# Patient Record
Sex: FEMALE | Race: WHITE | NOT HISPANIC OR LATINO | Employment: FULL TIME | URBAN - METROPOLITAN AREA
[De-identification: names, ages, dates, MRNs, and addresses within clinical notes are randomized per-mention and may not be internally consistent; named-entity substitution may affect disease eponyms.]

---

## 2024-04-02 NOTE — H&P (VIEW-ONLY)
Chief Complaint:  Patient presents with:  Pain: Lower groin,low back thinks kidney stone       PCP: Justin    SUBJECTIVE:    Adriana Leal is a 43 year old female  that presents to clinic today regarding     RLQ abdominal pain  Started 7 days ago  Feels previous to similar to previous kidney stones   Medications tried ibuprofen, tylneol  Intermittent sensations, no real pattern noted, non radiating.   Denies any diarrhea, vomiting, flank pain, sob, fevers, chills, hematuria, concern for STD, vaginal discharge.    NO Previous urine culture  Denies history of sepsis with kidney stones.  Needed stent x2.   US from 2012 shows 8mm non obstructed right kidney stone but no other imaging in system since then, follows urology with dr. Pettit.    Last Cr wnl Nov 2023  Contacted urology but has not been seen in 6 years in office so they could only get her in on April 11th.   ROS:  See HPI otherwise negative except as noted     HISTORY:  I have reviewed the patients problem list, current medications, allergies, and social history and updated them as needed.      Adriana Leal   reports that she has never smoked. She has never used smokeless tobacco.    OBJECTIVE:    /90   Pulse 117   Temp 37.3 °C (99.1 °F) (Temporal)   Wt 109.1 kg (240 lb 8 oz)   LMP  (LMP Unknown)   SpO2 98%   BMI 39.88 kg/m²     Physical Exam  Constitutional:       General: She is not in acute distress.     Appearance: She is well-developed. She is not diaphoretic.   HENT:      Head: Normocephalic and atraumatic.   Eyes:      Conjunctiva/sclera: Conjunctivae normal.      Pupils: Pupils are equal, round, and reactive to light.   Neck:      Vascular: No JVD.   Cardiovascular:      Rate and Rhythm: Normal rate and regular rhythm.      Heart sounds: Normal heart sounds. No murmur heard.     No friction rub. No gallop.   Pulmonary:      Effort: Pulmonary effort is normal. No respiratory distress.      Breath sounds: Normal breath  sounds. No stridor. No wheezing or rales.   Chest:      Chest wall: No tenderness.   Abdominal:      General: Bowel sounds are normal. There is no distension.      Palpations: Abdomen is soft. There is no mass.      Tenderness: There is no abdominal tenderness. There is no right CVA tenderness, left CVA tenderness, guarding or rebound. Negative signs include psoas sign and obturator sign.   Musculoskeletal:         General: No tenderness or deformity. Normal range of motion.      Cervical back: Normal range of motion and neck supple.      Comments: 2+ peripheral pulses, no delayed capillary refill  <2 seconds, no decreased sensation or decreased range of motion.  Intact plantar reflexes patient able to ambulate.  No skin changes noted no signs of infection.    5/5 strength.    Skin:     General: Skin is warm and dry.      Coloration: Skin is not pale.      Findings: No erythema or rash.   Neurological:      Mental Status: She is alert and oriented to person, place, and time.   Psychiatric:         Behavior: Behavior normal.         Thought Content: Thought content normal.         Judgment: Judgment normal.         MDM: patient aware of risks, competet to make own choices. Declined ER and other care. States its veyr much a kidney stones, wants to treat it like a kidney stone. Went over other abdominal pathologies and risks, patient still declined. Will go to ER if symtpoms persist, aware if urine is positive may need ER anyway, especially if she cannot get a sooner appointment with urology   Relevant lab work, imaging, consults and previous visits reviewed        ASSESSMENT/PLAN:     ASSESSMENT/PLAN:  1. Kidney stone - ICD9: 592.0, ICD10: N20.0 (primary diagnosis)  Toradol, zofran, fluids   Patient is aware she may need stent, I will send her urologist staff message to help expedite care.   -  URINE CULTURE  -  UA DIP B/O   Unremarkable   -  US KIDNEY/BLADDER  -  CBC + DIFF  -  COMP METABOLIC PANEL  -  XR ABDOMEN 2V  ROUTINE SUPINE W UPRIGHT/DECUB/CTL  -  XR ABDOMEN 3V KUB W/OBLIQUES    2. Essential hypertension - ICD9: 401.9, ICD10: I10  -  Controlled  -  Continue current medications  -  Recommend home blood pressure monitoring, to bring results to next visit  -  Encouraged sodium restriction, DASH or Mediterranean diet  -  Recommend regular aerobic exercise      Jannette Carney PA-C        Patient will follow up for this problem as needed. Otherwise will return for annual exam.   Discussed reasons to return or go to ER if symptoms worse or persist.     Medical Decision Making:     Problems:   Moderate:  1+ chronic illnesses with change  Data:   Unique test(s) ordered:  3+  Risk:   Moderate:  Moderate risk from testing/treatment    Medical Decision Making  Level: 4 - Moderate

## 2024-04-08 ENCOUNTER — TELEMEDICINE CLINICAL SUPPORT (OUTPATIENT)
Dept: PREADMISSION TESTING | Facility: HOSPITAL | Age: 44
End: 2024-04-08
Payer: COMMERCIAL

## 2024-04-08 VITALS — BODY MASS INDEX: 38.57 KG/M2 | WEIGHT: 240 LBS | HEIGHT: 66 IN

## 2024-04-08 RX ORDER — KETOROLAC TROMETHAMINE 10 MG/1
10 TABLET, FILM COATED ORAL EVERY 8 HOURS PRN
COMMUNITY
Start: 2024-04-02

## 2024-04-08 RX ORDER — HYDROCHLOROTHIAZIDE 25 MG/1
25 TABLET ORAL DAILY
COMMUNITY
Start: 2018-04-19

## 2024-04-08 RX ORDER — ONDANSETRON 4 MG/1
4 TABLET, ORALLY DISINTEGRATING ORAL EVERY 6 HOURS PRN
COMMUNITY
Start: 2024-04-02 | End: 2024-04-09

## 2024-04-08 RX ORDER — VALACYCLOVIR HYDROCHLORIDE 1 G/1
1000 TABLET, FILM COATED ORAL DAILY PRN
COMMUNITY
Start: 2017-12-24

## 2024-04-08 RX ORDER — AMLODIPINE BESYLATE 5 MG/1
5 TABLET ORAL DAILY
COMMUNITY
Start: 2018-04-19

## 2024-04-08 RX ORDER — FUROSEMIDE 20 MG/1
20 TABLET ORAL DAILY PRN
COMMUNITY
Start: 2018-08-31

## 2024-04-08 RX ORDER — PHENTERMINE HYDROCHLORIDE 37.5 MG/1
37.5 TABLET ORAL
COMMUNITY
Start: 2024-03-29 | End: 2024-06-27

## 2024-04-08 NOTE — PREPROCEDURE INSTRUCTIONS
Current Medications   Medication Instructions    amLODIPine (Norvasc) 5 mg tablet Take morning of surgery with sip of water, no other fluids    furosemide (Lasix) 20 mg tablet Continue until night before surgery    hydroCHLOROthiazide (HYDRODiuril) 25 mg tablet Take morning of surgery with sip of water, no other fluids    ketorolac (Toradol) 10 mg tablet Stop 7 days before surgery    ondansetron ODT (Zofran-ODT) 4 mg disintegrating tablet Take morning of surgery with sip of water, no other fluids    phentermine (Adipex-P) 37.5 mg tablet Pt reports she won't be starting until after surgery    valACYclovir (Valtrex) 1 gram tablet Take morning of surgery with sip of water, no other fluids                       NPO Instructions:    Do not eat any food after midnight the night before your surgery/procedure.    Additional Instructions:     Five Days before Surgery:  Review your medication instructions, stop indicated medications  Three Days before Surgery:  Review your medication instructions, stop indicated medications  The Day before Surgery:  Review your medication instructions, stop indicated medications  You will be contacted regarding the time of your arrival to facility and surgery time  Do not eat any food after Midnight  Day of Surgery:  Review your medication instructions, take indicated medications  If you have diabetes, please check your fasting blood sugar upon awakening.  If fasting blood sugar is <80 mg/dl, drink 100 ml of apple juice, time limit of 2 hours before  Wear  comfortable loose fitting clothing  Do not use moisturizers, creams, lotions or perfume  All jewelry and valuables should be left at home    PAT DISCHARGE INSTRUCTIONS    Please call the Same Day Surgery (SDS) Department of the hospital where your procedure will be performed between 2:00- 3:30 PM the day before your surgery. If you are scheduled on a Monday, or a Tuesday following a Monday holiday, you will need to call on the last business  day prior to your surgery.    [facility]  [address] [place] [address]  [phone]    Please let your surgeon know if:      You develop any open sores, shingles, burning or painful urination as these may increase your risk of an infection.   You no longer wish to have the surgery.   Any other personal circumstances change that may lead to the need to cancel or defer this surgery-such as being sick or getting admitted to any hospital within one week of your planned procedure.    Your contact details change, such as a change of address or phone number.    Starting now:     Please DO NOT drink alcohol or smoke for 24 hours before surgery. It is well known that quitting smoking can make a huge difference to your health and recovery from surgery. The longer you abstain from smoking, the better your chances of a healthy recovery. If you need help with quitting, call 9-800QUIT-NOW to be connected to a trained counselor who will discuss the best methods to help you quit.     Before your surgery:    Please stop all supplements 7 days prior to surgery. Or as directed by your surgeon.   Please stop taking NSAID pain medicine such as Advil and Motrin 7 days before surgery.    If you develop any fever, cough, cold, rashes, cuts, scratches, scrapes, urinary symptoms or infection anywhere on your body (including teeth and gums) prior to surgery, please call your surgeon’s office as soon as possible. This may require treatment to reduce the chance of cancellation on the day of surgery.    The day before your surgery:   DIET- Do not eat any food after MIDNIGHT.   Get a good night’s rest.  Use the special soap for bathing if you have been instructed to use one.    Scheduled surgery times may change and you will be notified if this occurs - please check your personal voicemail for any updates.     On the morning of surgery:   Wear comfortable, loose fitting clothes which open in the  front. Please do not wear moisturizers, creams, lotions, makeup or perfume.    Please bring with you to surgery:   Photo ID and insurance card   Current list of medicines and allergies   Pacemaker/ Defibrillator/Heart stent cards   CPAP machine and mask    Slings/ splints/ crutches   A copy of your complete advanced directive/DHPOA.    Please do NOT bring with you to surgery:   All jewelry and valuables should be left at home.   Prosthetic devices such as contact lenses, hearing aids, dentures, eyelash extensions, hairpins and body piercings must be removed prior to going in to the surgical suite.    After outpatient surgery:   A responsible adult MUST accompany you at the time of discharge and stay with you for 24 hours after your surgery. You may NOT drive yourself home after surgery.    Do not drive, operate machinery, make critical decisions or do activities that require co-ordination or balance until after a night’s sleep.   Do not drink alcoholic beverages for 24 hours.   Instructions for resuming your medications will be provided by your surgeon.    CALL YOUR DOCTOR AFTER SURGERY IF YOU HAVE:     Chills and/or a fever of 101° F or higher.    Redness, swelling, pus or drainage from your surgical wound or a bad smell from the wound.    Lightheadedness, fainting or confusion.    Persistent vomiting (throwing up) and are not able to eat or drink for 12 hours.    Three or more loose, watery bowel movements in 24 hours (diarrhea).   Difficulty or pain while urinating( after non-urological surgery)    Pain and swelling in your legs, especially if it is only on one side.    Difficulty breathing or are breathing faster than normal.    Any new concerning symptoms.      Reviewed pre-op instructions with patient, states understanding and denies further questions at this time.    If you have not received a call regarding your arrival time for surgery by 2pm on the day before surgery, you can call [phone].    Take  Care!

## 2024-04-11 ENCOUNTER — ANESTHESIA EVENT (OUTPATIENT)
Dept: OPERATING ROOM | Facility: HOSPITAL | Age: 44
End: 2024-04-11
Payer: COMMERCIAL

## 2024-04-12 ENCOUNTER — HOSPITAL ENCOUNTER (OUTPATIENT)
Facility: HOSPITAL | Age: 44
Setting detail: OUTPATIENT SURGERY
Discharge: HOME | End: 2024-04-12
Attending: UROLOGY | Admitting: UROLOGY
Payer: COMMERCIAL

## 2024-04-12 ENCOUNTER — ANESTHESIA (OUTPATIENT)
Dept: OPERATING ROOM | Facility: HOSPITAL | Age: 44
End: 2024-04-12
Payer: COMMERCIAL

## 2024-04-12 VITALS
WEIGHT: 243.17 LBS | HEIGHT: 66 IN | BODY MASS INDEX: 39.08 KG/M2 | HEART RATE: 97 BPM | SYSTOLIC BLOOD PRESSURE: 146 MMHG | RESPIRATION RATE: 16 BRPM | OXYGEN SATURATION: 97 % | DIASTOLIC BLOOD PRESSURE: 105 MMHG | TEMPERATURE: 97.3 F

## 2024-04-12 PROCEDURE — 3600000008 HC OR TIME - EACH INCREMENTAL 1 MINUTE - PROCEDURE LEVEL THREE: Performed by: UROLOGY

## 2024-04-12 PROCEDURE — 7100000009 HC PHASE TWO TIME - INITIAL BASE CHARGE: Performed by: UROLOGY

## 2024-04-12 PROCEDURE — A50590 PR FRAGMENT KIDNEY STONE/ ESWL: Performed by: ANESTHESIOLOGY

## 2024-04-12 PROCEDURE — A50590 PR FRAGMENT KIDNEY STONE/ ESWL: Performed by: ANESTHESIOLOGIST ASSISTANT

## 2024-04-12 PROCEDURE — 7100000002 HC RECOVERY ROOM TIME - EACH INCREMENTAL 1 MINUTE: Performed by: UROLOGY

## 2024-04-12 PROCEDURE — 3700000002 HC GENERAL ANESTHESIA TIME - EACH INCREMENTAL 1 MINUTE: Performed by: UROLOGY

## 2024-04-12 PROCEDURE — 2780000003 HC OR 278 NO HCPCS: Performed by: UROLOGY

## 2024-04-12 PROCEDURE — 2550000001 HC RX 255 CONTRASTS: Performed by: UROLOGY

## 2024-04-12 PROCEDURE — 7100000010 HC PHASE TWO TIME - EACH INCREMENTAL 1 MINUTE: Performed by: UROLOGY

## 2024-04-12 PROCEDURE — 3700000001 HC GENERAL ANESTHESIA TIME - INITIAL BASE CHARGE: Performed by: UROLOGY

## 2024-04-12 PROCEDURE — 2500000004 HC RX 250 GENERAL PHARMACY W/ HCPCS (ALT 636 FOR OP/ED): Mod: JZ | Performed by: UROLOGY

## 2024-04-12 PROCEDURE — C2617 STENT, NON-COR, TEM W/O DEL: HCPCS | Performed by: UROLOGY

## 2024-04-12 PROCEDURE — 2500000004 HC RX 250 GENERAL PHARMACY W/ HCPCS (ALT 636 FOR OP/ED): Performed by: ANESTHESIOLOGIST ASSISTANT

## 2024-04-12 PROCEDURE — C1769 GUIDE WIRE: HCPCS | Performed by: UROLOGY

## 2024-04-12 PROCEDURE — 7100000001 HC RECOVERY ROOM TIME - INITIAL BASE CHARGE: Performed by: UROLOGY

## 2024-04-12 PROCEDURE — 3600000003 HC OR TIME - INITIAL BASE CHARGE - PROCEDURE LEVEL THREE: Performed by: UROLOGY

## 2024-04-12 PROCEDURE — 2720000007 HC OR 272 NO HCPCS: Performed by: UROLOGY

## 2024-04-12 DEVICE — INLAY OPTIMA URETERAL STENT W/O GUIDEWIRE
Type: IMPLANTABLE DEVICE | Site: URETER | Status: FUNCTIONAL
Brand: BARD® INLAY OPTIMA® URETERAL STENT

## 2024-04-12 RX ORDER — SODIUM CHLORIDE, SODIUM LACTATE, POTASSIUM CHLORIDE, CALCIUM CHLORIDE 600; 310; 30; 20 MG/100ML; MG/100ML; MG/100ML; MG/100ML
INJECTION, SOLUTION INTRAVENOUS CONTINUOUS PRN
Status: DISCONTINUED | OUTPATIENT
Start: 2024-04-12 | End: 2024-04-12

## 2024-04-12 RX ORDER — FENTANYL CITRATE 50 UG/ML
INJECTION, SOLUTION INTRAMUSCULAR; INTRAVENOUS AS NEEDED
Status: DISCONTINUED | OUTPATIENT
Start: 2024-04-12 | End: 2024-04-12

## 2024-04-12 RX ORDER — ACETAMINOPHEN 325 MG/1
650 TABLET ORAL EVERY 4 HOURS PRN
Status: DISCONTINUED | OUTPATIENT
Start: 2024-04-12 | End: 2024-04-12 | Stop reason: HOSPADM

## 2024-04-12 RX ORDER — PROPOFOL 10 MG/ML
INJECTION, EMULSION INTRAVENOUS AS NEEDED
Status: DISCONTINUED | OUTPATIENT
Start: 2024-04-12 | End: 2024-04-12

## 2024-04-12 RX ORDER — SODIUM CHLORIDE, SODIUM LACTATE, POTASSIUM CHLORIDE, CALCIUM CHLORIDE 600; 310; 30; 20 MG/100ML; MG/100ML; MG/100ML; MG/100ML
100 INJECTION, SOLUTION INTRAVENOUS CONTINUOUS
Status: DISCONTINUED | OUTPATIENT
Start: 2024-04-12 | End: 2024-04-12 | Stop reason: HOSPADM

## 2024-04-12 RX ORDER — ONDANSETRON HYDROCHLORIDE 2 MG/ML
4 INJECTION, SOLUTION INTRAVENOUS ONCE AS NEEDED
Status: DISCONTINUED | OUTPATIENT
Start: 2024-04-12 | End: 2024-04-12 | Stop reason: HOSPADM

## 2024-04-12 RX ORDER — METOCLOPRAMIDE HYDROCHLORIDE 5 MG/ML
10 INJECTION INTRAMUSCULAR; INTRAVENOUS ONCE AS NEEDED
Status: DISCONTINUED | OUTPATIENT
Start: 2024-04-12 | End: 2024-04-12 | Stop reason: HOSPADM

## 2024-04-12 RX ORDER — CEFAZOLIN SODIUM 2 G/100ML
2 INJECTION, SOLUTION INTRAVENOUS ONCE
Status: COMPLETED | OUTPATIENT
Start: 2024-04-12 | End: 2024-04-12

## 2024-04-12 RX ORDER — ONDANSETRON HYDROCHLORIDE 2 MG/ML
INJECTION, SOLUTION INTRAVENOUS AS NEEDED
Status: DISCONTINUED | OUTPATIENT
Start: 2024-04-12 | End: 2024-04-12

## 2024-04-12 RX ORDER — MIDAZOLAM HYDROCHLORIDE 1 MG/ML
INJECTION, SOLUTION INTRAMUSCULAR; INTRAVENOUS AS NEEDED
Status: DISCONTINUED | OUTPATIENT
Start: 2024-04-12 | End: 2024-04-12

## 2024-04-12 RX ORDER — LIDOCAINE HYDROCHLORIDE 10 MG/ML
0.1 INJECTION INFILTRATION; PERINEURAL ONCE
Status: DISCONTINUED | OUTPATIENT
Start: 2024-04-12 | End: 2024-04-12 | Stop reason: HOSPADM

## 2024-04-12 RX ORDER — KETOROLAC TROMETHAMINE 30 MG/ML
INJECTION, SOLUTION INTRAMUSCULAR; INTRAVENOUS AS NEEDED
Status: DISCONTINUED | OUTPATIENT
Start: 2024-04-12 | End: 2024-04-12

## 2024-04-12 RX ADMIN — FENTANYL CITRATE 25 MCG: 50 INJECTION INTRAMUSCULAR; INTRAVENOUS at 08:21

## 2024-04-12 RX ADMIN — MIDAZOLAM 2 MG: 1 INJECTION INTRAMUSCULAR; INTRAVENOUS at 08:06

## 2024-04-12 RX ADMIN — FENTANYL CITRATE 50 MCG: 50 INJECTION INTRAMUSCULAR; INTRAVENOUS at 08:06

## 2024-04-12 RX ADMIN — CEFAZOLIN SODIUM 2 G: 2 INJECTION, SOLUTION INTRAVENOUS at 08:11

## 2024-04-12 RX ADMIN — ONDANSETRON 4 MG: 2 INJECTION INTRAMUSCULAR; INTRAVENOUS at 08:31

## 2024-04-12 RX ADMIN — SODIUM CHLORIDE, POTASSIUM CHLORIDE, SODIUM LACTATE AND CALCIUM CHLORIDE: 600; 310; 30; 20 INJECTION, SOLUTION INTRAVENOUS at 07:32

## 2024-04-12 RX ADMIN — PROPOFOL 200 MG: 10 INJECTION, EMULSION INTRAVENOUS at 08:06

## 2024-04-12 RX ADMIN — SODIUM CHLORIDE, POTASSIUM CHLORIDE, SODIUM LACTATE AND CALCIUM CHLORIDE: 600; 310; 30; 20 INJECTION, SOLUTION INTRAVENOUS at 08:11

## 2024-04-12 RX ADMIN — FENTANYL CITRATE 25 MCG: 50 INJECTION INTRAMUSCULAR; INTRAVENOUS at 08:39

## 2024-04-12 RX ADMIN — KETOROLAC TROMETHAMINE 30 MG: 30 INJECTION, SOLUTION INTRAMUSCULAR; INTRAVENOUS at 08:43

## 2024-04-12 SDOH — HEALTH STABILITY: MENTAL HEALTH: CURRENT SMOKER: 0

## 2024-04-12 ASSESSMENT — PAIN - FUNCTIONAL ASSESSMENT
PAIN_FUNCTIONAL_ASSESSMENT: 0-10

## 2024-04-12 ASSESSMENT — COLUMBIA-SUICIDE SEVERITY RATING SCALE - C-SSRS
6. HAVE YOU EVER DONE ANYTHING, STARTED TO DO ANYTHING, OR PREPARED TO DO ANYTHING TO END YOUR LIFE?: NO
1. IN THE PAST MONTH, HAVE YOU WISHED YOU WERE DEAD OR WISHED YOU COULD GO TO SLEEP AND NOT WAKE UP?: NO
2. HAVE YOU ACTUALLY HAD ANY THOUGHTS OF KILLING YOURSELF?: NO

## 2024-04-12 ASSESSMENT — PAIN SCALES - GENERAL
PAINLEVEL_OUTOF10: 0 - NO PAIN
PAIN_LEVEL: 2
PAINLEVEL_OUTOF10: 0 - NO PAIN

## 2024-04-12 NOTE — ANESTHESIA PREPROCEDURE EVALUATION
Patient: Adriana Leal    Procedure Information       Date/Time: 04/12/24 0745    Procedures:       Lithotripsy Extracorporeal Shock Wave (Right)      Cystoscopy with Insertion Stent Ureter ( REP WILY MENA [phone] ) (Right)    Location: [facility] / [facility]    Surgeons: Irvin Jay MD            Relevant Problems   No relevant active problems       Clinical information reviewed:   Tobacco  Allergies  Meds   Med Hx  Surg Hx  OB Status  Fam Hx  Soc   Hx        NPO Detail:  NPO/Void Status  Date of Last Liquid: 04/11/24  Time of Last Liquid: 2130  Date of Last Solid: 04/11/24  Time of Last Solid: 1800  Time of Last Void: 0623         Physical Exam    Airway  Mallampati: III  TM distance: >3 FB  Neck ROM: full     Cardiovascular - normal exam     Dental    Pulmonary - normal exam     Abdominal            Anesthesia Plan    History of general anesthesia?: yes  History of complications of general anesthesia?: no    ASA 2     MAC     The patient is not a current smoker.    intravenous induction   Anesthetic plan and risks discussed with patient.  Use of blood products discussed with patient who.    Plan discussed with CAA.

## 2024-04-12 NOTE — INTERVAL H&P NOTE
H&P reviewed. The patient was examined and there are no changes to the H&P.  Cystoscopy, right retrograde pyelogram, stent, ESWL today.

## 2024-04-12 NOTE — OP NOTE
Lithotripsy Extracorporeal Shock Wave (R), Cystoscopy with Insertion Stent Ureter ( REP WILY MENA [phone] ) (R) Operative Note     Date: 2024  OR Location: [facility]    Name: Adriana Leal : 1980, Age: 43 y.o., MRN: [id number], Sex: female    Diagnosis  Pre-op Diagnosis     * Calculus of kidney [N20.0] Post-op Diagnosis     * Calculus of kidney [N20.0]     Procedures  Lithotripsy Extracorporeal Shock Wave  04605 - [place] LITHOTRIPSY XTRCORP SHOCK WAVE    Cystoscopy with Insertion Stent Ureter ( REP WILY MENA [phone] )  25076 - [place] CYSTO W/INSERT URETERAL STENT      Surgeons      * Irvin Jay - Primary    Resident/Fellow/Other Assistant:  Surgeons and Role:  * No surgeons found with a matching role *    Procedure Summary  Anesthesia: General  ASA: II  Anesthesia Staff: Anesthesiologist: Richelle Peoples DO  C-AA: CLINT Lundberg  Estimated Blood Loss: 0 mL  Intra-op Medications:   Administrations occurring from 0745 to 0845 on 24:   Medication Name Total Dose   iohexol (OMNIPaque) 240 mg iodine/mL solution 5 mL   ceFAZolin in dextrose (iso-os) (Ancef) IVPB 2 g 2 g              Anesthesia Record               Intraprocedure I/O Totals          Intake    LR infusion 600.00 mL    Total Intake 600 mL          Specimen: No specimens collected     Staff:   Circulator: Reina Salcedo RN  Scrub Person: Merari Downs           Drains and/or Catheters: 6 Spanish by 26 cm double-J ureteral stent    Findings: Stone fragmented    Indications: Adriana Leal is an 43 y.o. female who is having surgery for RT KIDNEY STONE N20.0.  She has a large stone near the UPJ and a smaller 1 cm stone nearby in the right kidney.  Options were reviewed she is scheduled for right-sided ESWL, and stent placement.  Risks including infection, bleeding, injury to kidney, obstruction for fragments, and stent discomfort were removed reviewed.  She understands she may require a follow-up procedure.  Written  consent was obtained.    Procedure Details: Patient was taken to the operating room and given general anesthesia.  She was sterilely prepped and draped in the modified lithotomy position.  A cystoscope was advanced through the urethra and into the bladder.  No bladder tumors or stones were identified.  A open-ended ureteral catheter was inserted into the right ureteral orifice.  Retrograde pyelograms performed to delineate anatomy.  The stones were visible near the renal pelvis.  A 6 Polish by 26 cm double-J ureteral stent was advanced over a flexible wire and into the kidney.  Correct positioning was confirmed with fluoroscopy and visualization of the distal end within the bladder.  ESWL was performed.  Total 3000 shocks were delivered there appeared to be good fragmentation of the large stone.  The smaller stone may have been included in the lithotripsy and was not clearly visible at the completion.  The patient tolerated the procedure was transferred to the recovery room in stable condition.  She will follow-up in the office with a KUB.    Complications:  None; patient tolerated the procedure well.          Irvin Jay  Phone Number: [phone]

## 2024-04-12 NOTE — ANESTHESIA POSTPROCEDURE EVALUATION
Patient: Adriana Leal    Procedure Summary       Date: 04/12/24 Room / Location: [facility]    Anesthesia Start: 0801 Anesthesia Stop: 0900    Procedures:       Lithotripsy Extracorporeal Shock Wave (Right)      Cystoscopy with Insertion Stent Ureter ( REP WILY MENA [phone] ) (Right) Diagnosis:       Calculus of kidney      (RT KIDNEY STONE N20.0)    Surgeons: Irvin Jay MD Responsible Provider: Richelle Peoples DO    Anesthesia Type: MAC ASA Status: 2            Anesthesia Type: MAC    Vitals Value Taken Time   /93 04/12/24 0900   Temp 36 04/12/24 0900   Pulse 100 04/12/24 0900   Resp 18 04/12/24 0900   SpO2 98 04/12/24 0900       Anesthesia Post Evaluation    Patient location during evaluation: PACU  Patient participation: complete - patient participated  Level of consciousness: awake and alert  Pain score: 2  Pain management: adequate  Airway patency: patent  Cardiovascular status: acceptable  Respiratory status: acceptable  Hydration status: acceptable  Postoperative Nausea and Vomiting: none        No notable events documented.

## 2024-04-12 NOTE — ANESTHESIA PROCEDURE NOTES
Airway  Date/Time: 4/12/2024 8:09 AM  Urgency: elective    Airway not difficult    Staffing  Performed: CLINT   Authorized by: Richelle Peoples DO    Performed by: CLINT Lundberg  Patient location during procedure: OR    Indications and Patient Condition  Indications for airway management: anesthesia  Spontaneous Ventilation: absent  Preoxygenated: yes  Mask difficulty assessment: 1 - vent by mask    Final Airway Details  Final airway type: supraglottic airway      Successful airway: Size 4     Number of attempts at approach: 1

## 2024-04-15 ASSESSMENT — PAIN SCALES - GENERAL: PAINLEVEL_OUTOF10: 6

## 2024-04-29 ENCOUNTER — TELEPHONE (OUTPATIENT)
Dept: PREADMISSION TESTING | Facility: HOSPITAL | Age: 44
End: 2024-04-29

## 2024-04-29 ENCOUNTER — ANESTHESIA EVENT (OUTPATIENT)
Dept: OPERATING ROOM | Facility: HOSPITAL | Age: 44
End: 2024-04-29
Payer: COMMERCIAL

## 2024-04-30 RX ORDER — SODIUM CHLORIDE, SODIUM LACTATE, POTASSIUM CHLORIDE, CALCIUM CHLORIDE 600; 310; 30; 20 MG/100ML; MG/100ML; MG/100ML; MG/100ML
100 INJECTION, SOLUTION INTRAVENOUS CONTINUOUS
Status: CANCELLED | OUTPATIENT
Start: 2024-04-30

## 2024-04-30 RX ORDER — ONDANSETRON HYDROCHLORIDE 2 MG/ML
4 INJECTION, SOLUTION INTRAVENOUS ONCE AS NEEDED
Status: CANCELLED | OUTPATIENT
Start: 2024-04-30

## 2024-04-30 RX ORDER — DROPERIDOL 2.5 MG/ML
0.62 INJECTION, SOLUTION INTRAMUSCULAR; INTRAVENOUS ONCE AS NEEDED
Status: CANCELLED | OUTPATIENT
Start: 2024-04-30

## 2024-04-30 RX ORDER — OXYCODONE HYDROCHLORIDE 5 MG/1
5 TABLET ORAL EVERY 4 HOURS PRN
Status: CANCELLED | OUTPATIENT
Start: 2024-04-30

## 2024-05-01 ENCOUNTER — APPOINTMENT (OUTPATIENT)
Dept: RADIOLOGY | Facility: HOSPITAL | Age: 44
End: 2024-05-01
Payer: COMMERCIAL

## 2024-05-01 ENCOUNTER — HOSPITAL ENCOUNTER (OUTPATIENT)
Facility: HOSPITAL | Age: 44
Setting detail: OUTPATIENT SURGERY
Discharge: HOME | End: 2024-05-01
Attending: UROLOGY | Admitting: UROLOGY
Payer: COMMERCIAL

## 2024-05-01 ENCOUNTER — ANESTHESIA (OUTPATIENT)
Dept: OPERATING ROOM | Facility: HOSPITAL | Age: 44
End: 2024-05-01
Payer: COMMERCIAL

## 2024-05-01 VITALS
SYSTOLIC BLOOD PRESSURE: 184 MMHG | HEART RATE: 95 BPM | WEIGHT: 240.52 LBS | TEMPERATURE: 97.7 F | RESPIRATION RATE: 16 BRPM | HEIGHT: 68 IN | BODY MASS INDEX: 36.45 KG/M2 | OXYGEN SATURATION: 99 % | DIASTOLIC BLOOD PRESSURE: 103 MMHG

## 2024-05-01 DIAGNOSIS — N20.0 KIDNEY STONE: Primary | ICD-10-CM

## 2024-05-01 PROBLEM — I10 HTN (HYPERTENSION): Status: ACTIVE | Noted: 2024-05-01

## 2024-05-01 PROCEDURE — 2500000005 HC RX 250 GENERAL PHARMACY W/O HCPCS: Performed by: NURSE ANESTHETIST, CERTIFIED REGISTERED

## 2024-05-01 PROCEDURE — 82365 CALCULUS SPECTROSCOPY: CPT | Performed by: UROLOGY

## 2024-05-01 PROCEDURE — 7100000009 HC PHASE TWO TIME - INITIAL BASE CHARGE: Performed by: UROLOGY

## 2024-05-01 PROCEDURE — 87086 URINE CULTURE/COLONY COUNT: CPT | Mod: GEALAB | Performed by: UROLOGY

## 2024-05-01 PROCEDURE — C1769 GUIDE WIRE: HCPCS | Performed by: UROLOGY

## 2024-05-01 PROCEDURE — 3700000002 HC GENERAL ANESTHESIA TIME - EACH INCREMENTAL 1 MINUTE: Performed by: UROLOGY

## 2024-05-01 PROCEDURE — A52310 PR CYSTOSCOPY,REMV CALCULUS,SIMPLE: Performed by: NURSE ANESTHETIST, CERTIFIED REGISTERED

## 2024-05-01 PROCEDURE — 3600000008 HC OR TIME - EACH INCREMENTAL 1 MINUTE - PROCEDURE LEVEL THREE: Performed by: UROLOGY

## 2024-05-01 PROCEDURE — A52310 PR CYSTOSCOPY,REMV CALCULUS,SIMPLE: Performed by: ANESTHESIOLOGY

## 2024-05-01 PROCEDURE — 7100000001 HC RECOVERY ROOM TIME - INITIAL BASE CHARGE: Performed by: UROLOGY

## 2024-05-01 PROCEDURE — 3600000003 HC OR TIME - INITIAL BASE CHARGE - PROCEDURE LEVEL THREE: Performed by: UROLOGY

## 2024-05-01 PROCEDURE — 3700000001 HC GENERAL ANESTHESIA TIME - INITIAL BASE CHARGE: Performed by: UROLOGY

## 2024-05-01 PROCEDURE — 7100000002 HC RECOVERY ROOM TIME - EACH INCREMENTAL 1 MINUTE: Performed by: UROLOGY

## 2024-05-01 PROCEDURE — 7100000010 HC PHASE TWO TIME - EACH INCREMENTAL 1 MINUTE: Performed by: UROLOGY

## 2024-05-01 PROCEDURE — 76000 FLUOROSCOPY <1 HR PHYS/QHP: CPT | Mod: 59

## 2024-05-01 PROCEDURE — 2500000004 HC RX 250 GENERAL PHARMACY W/ HCPCS (ALT 636 FOR OP/ED): Performed by: ANESTHESIOLOGY

## 2024-05-01 PROCEDURE — 2720000007 HC OR 272 NO HCPCS: Performed by: UROLOGY

## 2024-05-01 PROCEDURE — 2500000004 HC RX 250 GENERAL PHARMACY W/ HCPCS (ALT 636 FOR OP/ED): Performed by: NURSE ANESTHETIST, CERTIFIED REGISTERED

## 2024-05-01 RX ORDER — CEFAZOLIN SODIUM 2 G/100ML
2 INJECTION, SOLUTION INTRAVENOUS ONCE
Status: DISCONTINUED | OUTPATIENT
Start: 2024-05-01 | End: 2024-05-01 | Stop reason: HOSPADM

## 2024-05-01 RX ORDER — SODIUM CHLORIDE, SODIUM LACTATE, POTASSIUM CHLORIDE, CALCIUM CHLORIDE 600; 310; 30; 20 MG/100ML; MG/100ML; MG/100ML; MG/100ML
100 INJECTION, SOLUTION INTRAVENOUS CONTINUOUS
Status: DISCONTINUED | OUTPATIENT
Start: 2024-05-01 | End: 2024-05-01 | Stop reason: HOSPADM

## 2024-05-01 RX ORDER — CEFAZOLIN 1 G/1
INJECTION, POWDER, FOR SOLUTION INTRAVENOUS AS NEEDED
Status: DISCONTINUED | OUTPATIENT
Start: 2024-05-01 | End: 2024-05-01

## 2024-05-01 RX ORDER — FENTANYL CITRATE 50 UG/ML
INJECTION, SOLUTION INTRAMUSCULAR; INTRAVENOUS AS NEEDED
Status: DISCONTINUED | OUTPATIENT
Start: 2024-05-01 | End: 2024-05-01

## 2024-05-01 RX ORDER — KETOROLAC TROMETHAMINE 30 MG/ML
INJECTION, SOLUTION INTRAMUSCULAR; INTRAVENOUS AS NEEDED
Status: DISCONTINUED | OUTPATIENT
Start: 2024-05-01 | End: 2024-05-01

## 2024-05-01 RX ORDER — LIDOCAINE HYDROCHLORIDE 20 MG/ML
INJECTION, SOLUTION INFILTRATION; PERINEURAL AS NEEDED
Status: DISCONTINUED | OUTPATIENT
Start: 2024-05-01 | End: 2024-05-01

## 2024-05-01 RX ORDER — KETOROLAC TROMETHAMINE 10 MG/1
10 TABLET, FILM COATED ORAL EVERY 6 HOURS PRN
Qty: 20 TABLET | Refills: 0 | Status: SHIPPED | OUTPATIENT
Start: 2024-05-01

## 2024-05-01 RX ORDER — ONDANSETRON HYDROCHLORIDE 2 MG/ML
INJECTION, SOLUTION INTRAVENOUS AS NEEDED
Status: DISCONTINUED | OUTPATIENT
Start: 2024-05-01 | End: 2024-05-01

## 2024-05-01 RX ORDER — MIDAZOLAM HYDROCHLORIDE 1 MG/ML
INJECTION INTRAMUSCULAR; INTRAVENOUS AS NEEDED
Status: DISCONTINUED | OUTPATIENT
Start: 2024-05-01 | End: 2024-05-01

## 2024-05-01 RX ORDER — PROPOFOL 10 MG/ML
INJECTION, EMULSION INTRAVENOUS AS NEEDED
Status: DISCONTINUED | OUTPATIENT
Start: 2024-05-01 | End: 2024-05-01

## 2024-05-01 RX ADMIN — MIDAZOLAM HYDROCHLORIDE 2 MG: 1 INJECTION, SOLUTION INTRAMUSCULAR; INTRAVENOUS at 12:40

## 2024-05-01 RX ADMIN — DEXAMETHASONE SODIUM PHOSPHATE 4 MG: 4 INJECTION INTRA-ARTICULAR; INTRALESIONAL; INTRAMUSCULAR; INTRAVENOUS; SOFT TISSUE at 12:59

## 2024-05-01 RX ADMIN — KETOROLAC TROMETHAMINE 30 MG: 30 INJECTION, SOLUTION INTRAMUSCULAR; INTRAVENOUS at 12:59

## 2024-05-01 RX ADMIN — PROPOFOL 200 MG: 10 INJECTION, EMULSION INTRAVENOUS at 12:44

## 2024-05-01 RX ADMIN — CEFAZOLIN 2 G: 330 INJECTION, POWDER, FOR SOLUTION INTRAMUSCULAR; INTRAVENOUS at 12:52

## 2024-05-01 RX ADMIN — ONDANSETRON 4 MG: 2 INJECTION, SOLUTION INTRAMUSCULAR; INTRAVENOUS at 12:59

## 2024-05-01 RX ADMIN — SODIUM CHLORIDE, POTASSIUM CHLORIDE, SODIUM LACTATE AND CALCIUM CHLORIDE 100 ML/HR: 600; 310; 30; 20 INJECTION, SOLUTION INTRAVENOUS at 11:25

## 2024-05-01 RX ADMIN — LIDOCAINE HYDROCHLORIDE 100 MG: 20 INJECTION, SOLUTION INFILTRATION; PERINEURAL at 12:44

## 2024-05-01 RX ADMIN — FENTANYL CITRATE 100 MCG: 50 INJECTION, SOLUTION INTRAMUSCULAR; INTRAVENOUS at 12:44

## 2024-05-01 SDOH — HEALTH STABILITY: MENTAL HEALTH: CURRENT SMOKER: 0

## 2024-05-01 ASSESSMENT — COLUMBIA-SUICIDE SEVERITY RATING SCALE - C-SSRS
1. IN THE PAST MONTH, HAVE YOU WISHED YOU WERE DEAD OR WISHED YOU COULD GO TO SLEEP AND NOT WAKE UP?: NO
6. HAVE YOU EVER DONE ANYTHING, STARTED TO DO ANYTHING, OR PREPARED TO DO ANYTHING TO END YOUR LIFE?: NO
2. HAVE YOU ACTUALLY HAD ANY THOUGHTS OF KILLING YOURSELF?: NO

## 2024-05-01 NOTE — ANESTHESIA PROCEDURE NOTES
Airway  Date/Time: 5/1/2024 12:44 PM  Urgency: elective    Airway not difficult    Staffing  Performed: CRNA   Authorized by: Igor Jack MD    Performed by: BETSY Gary-MANNY  Patient location during procedure: OR    Indications and Patient Condition  Indications for airway management: anesthesia  Spontaneous ventilation: present  Sedation level: deep  Preoxygenated: yes  Patient position: sniffing  MILS maintained throughout  Mask difficulty assessment: 0 - not attempted    Final Airway Details  Final airway type: supraglottic airway      Successful airway: Supraglottic airway: Igel.  Size 4     Number of attempts at approach: 1  Ventilation between attempts: BVM  Number of other approaches attempted: 0

## 2024-05-01 NOTE — ANESTHESIA PREPROCEDURE EVALUATION
Patient: Adriana Leal    Procedure Information       Date/Time: 24 1200    Procedures:       CYSTOSCOPY WITH LITHOTRIPSY LASER (Right)      CYSTOSCOPY WITH REMOVAL STENT URETER (Right)    [facility]    Surgeons: Irvin Jay MD          There were no vitals filed for this visit.    Past Surgical History:   Procedure Laterality Date   • CARPAL TUNNEL RELEASE Bilateral    •  SECTION, CLASSIC  2014     Section   • COSMETIC SURGERY      breast reduction   • EXTRACORPOREAL SHOCK WAVE LITHOTRIPSY Right 2024   • KIDNEY STONE SURGERY       Past Medical History:   Diagnosis Date   • Class 2 obesity with body mass index (BMI) of 39.0 to 39.9 in adult    • Hypertension    • Nephrolithiasis     Right   • Personal history of diseases of the skin and subcutaneous tissue 2014    History of impetigo   • Personal history of other infectious and parasitic diseases 2014    History of herpes simplex infection   • Urinary tract infection        Current Facility-Administered Medications:   •  lactated Ringer's infusion, 100 mL/hr, intravenous, Continuous, Igor Jack MD  Prior to Admission medications    Medication Sig Start Date End Date Taking? Authorizing Provider   amLODIPine (Norvasc) 5 mg tablet Take 1 tablet (5 mg) by mouth once daily. 18   Historical Provider, MD   furosemide (Lasix) 20 mg tablet Take 1 tablet (20 mg) by mouth once daily as needed. 18   Historical Provider, MD   hydroCHLOROthiazide (HYDRODiuril) 25 mg tablet Take 1 tablet (25 mg) by mouth once daily. 18   Historical Provider, MD   ketorolac (Toradol) 10 mg tablet Take 1 tablet (10 mg) by mouth every 8 hours if needed. 24   Historical Provider, MD   phentermine (Adipex-P) 37.5 mg tablet Take 1 tablet (37.5 mg) by mouth once daily. Not yet started 3/29/24 6/27/24  Historical Provider, MD   valACYclovir (Valtrex) 1 gram tablet Take 1 tablet (1,000 mg) by mouth  "once daily as needed. 12/24/17   Historical Provider, MD     Allergies   Allergen Reactions   • Clindamycin Other     Elevated temperature, flushing of face   • Sulfa (Sulfonamide Antibiotics) Swelling   • Metformin Cough   • Penicillins Rash     Social History     Tobacco Use   • Smoking status: Never   • Smokeless tobacco: Never   Substance Use Topics   • Alcohol use: Yes     Comment: occasionally/ rare         Chemistry    Lab Results   Component Value Date/Time     05/24/2018 0929    K 4.0 05/24/2018 0929     05/24/2018 0929    CO2 27 05/24/2018 0929    BUN 13 05/24/2018 0929    CREATININE 0.73 05/24/2018 0929    Lab Results   Component Value Date/Time    CALCIUM 9.8 05/24/2018 0929    ALKPHOS 71 02/07/2018 1117    AST 15 05/24/2018 0929    ALT 21 02/07/2018 1117    BILITOT 1.0 02/07/2018 1117          Lab Results   Component Value Date/Time    WBC 11.1 02/07/2018 1117    HGB 13.1 02/07/2018 1117    HCT 39.8 02/07/2018 1117     02/07/2018 1117     No results found for: \"PROTIME\", \"PTT\", \"INR\"  No results found for this or any previous visit (from the past 4464 hour(s)).  No results found for this or any previous visit from the past 1095 days.    Relevant Problems   Cardiac   (+) HTN (hypertension)      Pulmonary (within normal limits)      Neuro (within normal limits)      GI (within normal limits)      /Renal   (+) Nephrolithiasis      Liver (within normal limits)      Endocrine (within normal limits)      Hematology (within normal limits)      Musculoskeletal (within normal limits)      ID (within normal limits)      Skin (within normal limits)      GYN (within normal limits)       Clinical information reviewed:       Med Hx  Surg Hx            NPO Detail:  No data recorded     Physical Exam    Airway  Mallampati: II  TM distance: >3 FB  Neck ROM: full     Cardiovascular - normal exam     Dental    Pulmonary    Abdominal          Anesthesia Plan    History of general anesthesia?: " yes  History of complications of general anesthesia?: no    ASA 3     general     The patient is not a current smoker.  Patient was not previously instructed to abstain from smoking on day of procedure.  Patient did not smoke on day of procedure.  Education provided regarding risk of obstructive sleep apnea.  intravenous induction   Anesthetic plan and risks discussed with patient.    Plan discussed with CRNA.

## 2024-05-01 NOTE — INTERVAL H&P NOTE
H&P reviewed. The patient was examined and there are no changes to the H&P.  Right ureteroscopy, laser lithotripsy, stone basketing, stent placement today

## 2024-05-01 NOTE — OP NOTE
CYSTOSCOPY WITH LITHOTRIPSY LASER (R), CYSTOSCOPY WITH REMOVAL STENT URETER (R) Operative Note     Date: 2024  OR Location: [facility]    Name: Adriana Leal : 1980, Age: 43 y.o., MRN: [id number], Sex: female    Diagnosis  Pre-op Diagnosis     * Kidney stone [N20.0] Post-op Diagnosis     * Kidney stone [N20.0]     Procedures  CYSTOSCOPY WITH LITHOTRIPSY LASER  [address]11 - [place] CYSTO W/URETEROSCOPY W/LITHOTRIPSY    CYSTOSCOPY WITH REMOVAL STENT URETER  [address]87 - [place] CYSTO W/SIMPLE REMOVAL STONE & STENT      Surgeons      * Irvin Jay - Primary    Resident/Fellow/Other Assistant:  Surgeons and Role:  * No surgeons found with a matching role *    Procedure Summary  Anesthesia: Consult  ASA: III  Anesthesia Staff: Anesthesiologist: Igor Jack MD  CRNA: JAZIEL RealCRNA; ROMEL Gary  Estimated Blood Loss: 10 mL  Intra-op Medications: Administrations occurring from 1200 to 1315 on 24:  * No intraprocedure medications in log *           Anesthesia Record               Intraprocedure I/O Totals          Intake    lactated Ringer's infusion 500.00 mL    Total Intake 500 mL          Specimen:   ID Type Source Tests Collected by Time   A : RIGHT URETER AND KIDNEY STONES Calculus Urine, Clean Catch CALCULI (STONE) ANALYSIS Irvin Jay MD 2024 1309   B : URINE CULTURE Fluid URINE CLEAN CATCH URINE CULTURE Irvin Jay MD 2024 1337        Staff:   Circulator: Viviane Singh RN; Christina Shetty RN  Relief Scrub: Abbey Elder  Scrub Person: Mindy Solorzano           Drains and/or Catheters: 6 Lao by 26 cm double-J ureteral stent with attached suture    Findings: Stones fragmented and removed from right ureter and right kidney    Indications: Adriana Leal is an 43 y.o. female who is having surgery for RT KIDNEY STONE N20.0.  She underwent prior ESWL.  She has a stent in place.  She passed many fragments after ESWL and stent placement.  She presents today  for follow-up procedure with ureteroscopy for treatment of remaining stones.  Risks include infection, bleeding, injury to the ureter and urinary tract, need for further procedures were reviewed.  She understands he may require a stent.  Written consent was obtained.    Procedure Details: Patient was taken to the operating room and given general anesthesia.  She was sterilely prepped and draped in the modified lithotomy position.  She received preoperative Ancef.  A cystoscope was advanced through the urethra and into the bladder.  Urine culture was sent.  A alligator grasper was used to remove the ureteral stent on the right to the urethral meatus.  A wire was advanced up the stent and the stent was withdrawn.  A semirigid ureteroscope was inserted.  Within the distal ureter there was many stones.  A 200 µm holmium laser fiber was used to fragment the stones and cells were withdrawn.  At the completion of this portion of procedure no stones were identified remaining within the ureter on the right.  A second wire was placed and an access sheath sheath was advanced over the wire and into the kidney.  Flexible ureteroscope was advanced into the kidney.  A large stones identified in the pelvis which was thoroughly fragmented.  Additionally a 1 cm stone was identified laterally and no lateral lower calyx and this was fragmented.  Stone basketing was performed to remove fragments from the kidney.  Remaining fragments.  Small.  The ureteroscope was withdrawn.  A 6 Telugu by 26 cm double-J ureteral stent was placed with an attached suture.  She was transferred to the recovery room in stable condition.    She will follow-up in the office in 2 days for removal of the stent.  She was discharged on cephalexin.    Complications:  None; patient tolerated the procedure well.              Irvin Jay  Phone Number: [phone]

## 2024-05-01 NOTE — ANESTHESIA POSTPROCEDURE EVALUATION
Patient: Adriana Leal    Procedure Summary       Date: 05/01/24 Room / Location: [facility] OR 07 / [facility]    Anesthesia Start: 1238 Anesthesia Stop: 1350    Procedures:       CYSTOSCOPY WITH LITHOTRIPSY LASER (Right)      CYSTOSCOPY WITH REMOVAL STENT URETER (Right) Diagnosis:       Kidney stone      (RT KIDNEY STONE N20.0)    Surgeons: Irvin Jay MD Responsible Provider: Igor aJck MD    Anesthesia Type: general ASA Status: 3            Anesthesia Type: general    Vitals Value Taken Time   /103 05/01/24 1404   Temp 36.5 °C (97.7 °F) 05/01/24 1348   Pulse 99 05/01/24 1404   Resp 18 05/01/24 1404   SpO2 96 % 05/01/24 1404       Anesthesia Post Evaluation    Patient location during evaluation: PACU  Patient participation: complete - patient participated  Level of consciousness: awake  Pain management: adequate  Multimodal analgesia pain management approach  Airway patency: patent  Two or more strategies used to mitigate risk of obstructive sleep apnea  Cardiovascular status: acceptable  Respiratory status: acceptable  Hydration status: acceptable  Postoperative Nausea and Vomiting: none        No notable events documented.

## 2024-05-03 LAB — BACTERIA UR CULT: NO GROWTH

## 2024-05-06 LAB
APPEARANCE STONE: NORMAL
COMPN STONE: NORMAL
SPECIMEN WT: 493 MG

## (undated) DEVICE — GLOVE, SURGICAL, PROTEXIS,  7.5, PF, LATEX

## (undated) DEVICE — COLLECTION BAG, FLUID, NON-STERILE

## (undated) DEVICE — GUIDEWIRE, NITINOL, 0.038 150CM, STRAIGHT TIP

## (undated) DEVICE — SYRINGE, LUER LOCK, 12ML

## (undated) DEVICE — TRAY, MINOR, SINGLE BASIN, STERILE

## (undated) DEVICE — BAG, PRESSURE INFUSER, 3000 CC, LF

## (undated) DEVICE — SOLUTION, IRRIGATION, USP, SODIUM CHLORIDE 0.9%, 3000 ML, BAG

## (undated) DEVICE — CATHETER, URETERAL, POLLACK, OPEN END, 5.5 FR, 70 CM

## (undated) DEVICE — TUBING, SUCTION, CONNECTING, NON-CONDUCTIVE, SURE GRIP CONNECTORS, 3/16 IN X 10 FT

## (undated) DEVICE — GUIDEWIRE, STRAIGHT, GLIDEWIRE, 0.038 IN X 150 CM, 3 CM TIP

## (undated) DEVICE — TOWEL PACK, STERILE, 4/PACK, BLUE

## (undated) DEVICE — BASKET, URETERAL, STONE RETRIEVAL, N-CIRCLE, NITNOL

## (undated) DEVICE — SOLUTION, IRRIGATION, X RX SODIUM CHL 0.9%, 1000ML BTL

## (undated) DEVICE — IRRIGATION SET, CYSTOSCOPY, TURP, Y, CONTINUOUS, 81 IN

## (undated) DEVICE — BIOPSY PORT SEALS, SINGLE USE, 3 FR

## (undated) DEVICE — Device

## (undated) DEVICE — PREP TRAY, SKIN, DRY, W/GLOVES

## (undated) DEVICE — GLOVE, PROTEXIS PI CLASSIC, SZ-7.5, PF, LF